# Patient Record
Sex: MALE | Race: WHITE | NOT HISPANIC OR LATINO | Employment: OTHER | URBAN - METROPOLITAN AREA
[De-identification: names, ages, dates, MRNs, and addresses within clinical notes are randomized per-mention and may not be internally consistent; named-entity substitution may affect disease eponyms.]

---

## 2018-10-10 ENCOUNTER — HISTORICAL (OUTPATIENT)
Dept: ADMINISTRATIVE | Facility: HOSPITAL | Age: 63
End: 2018-10-10

## 2018-10-10 LAB
ABS NEUT (OLG): 2.3
ALBUMIN SERPL-MCNC: 4.5 GM/DL (ref 3.4–5)
ALBUMIN/GLOB SERPL: 1.73 {RATIO} (ref 1.5–2.5)
ALP SERPL-CCNC: 55 UNIT/L (ref 38–126)
ALT SERPL-CCNC: 30 UNIT/L (ref 7–52)
AST SERPL-CCNC: 30 UNIT/L (ref 15–37)
BILIRUB SERPL-MCNC: 1 MG/DL (ref 0.2–1)
BILIRUBIN DIRECT+TOT PNL SERPL-MCNC: 0.2 MG/DL (ref 0–0.5)
BILIRUBIN DIRECT+TOT PNL SERPL-MCNC: 0.8 MG/DL
BUN SERPL-MCNC: 24 MG/DL (ref 7–18)
CALCIUM SERPL-MCNC: 9.2 MG/DL (ref 8.5–10)
CHLORIDE SERPL-SCNC: 105 MMOL/L (ref 98–107)
CHOLEST SERPL-MCNC: 248 MG/DL (ref 0–200)
CHOLEST/HDLC SERPL: 4.8 {RATIO}
CO2 SERPL-SCNC: 32 MMOL/L (ref 21–32)
CREAT SERPL-MCNC: 0.78 MG/DL (ref 0.6–1.3)
ERYTHROCYTE [DISTWIDTH] IN BLOOD BY AUTOMATED COUNT: 12.9 % (ref 11.5–17)
GLOBULIN SER-MCNC: 2.6 GM/DL (ref 1.2–3)
GLUCOSE SERPL-MCNC: 106 MG/DL (ref 74–106)
HCT VFR BLD AUTO: 50.3 % (ref 42–52)
HDLC SERPL-MCNC: 52 MG/DL (ref 35–60)
HGB BLD-MCNC: 16.6 GM/DL (ref 14–18)
LDLC SERPL CALC-MCNC: 188 MG/DL (ref 0–129)
LYMPHOCYTES # BLD AUTO: 1.9 X10(3)/MCL (ref 0.6–3.4)
LYMPHOCYTES NFR BLD AUTO: 40.2 % (ref 13–40)
MCH RBC QN AUTO: 33.7 PG (ref 27–31.2)
MCHC RBC AUTO-ENTMCNC: 33 GM/DL (ref 32–36)
MCV RBC AUTO: 102 FL (ref 80–94)
MONOCYTES # BLD AUTO: 0.5 X10(3)/MCL (ref 0–1.8)
MONOCYTES NFR BLD AUTO: 10.7 % (ref 0.1–24)
NEUTROPHILS NFR BLD AUTO: 49.1 % (ref 47–80)
PLATELET # BLD AUTO: 189 X10(3)/MCL (ref 130–400)
PMV BLD AUTO: 8.8 FL
POTASSIUM SERPL-SCNC: 4.5 MMOL/L (ref 3.5–5.1)
PROT SERPL-MCNC: 7.1 GM/DL (ref 6.4–8.2)
PSA SERPL-MCNC: 1.53 NG/ML (ref 0–4.5)
RBC # BLD AUTO: 4.92 X10(6)/MCL (ref 4.7–6.1)
SODIUM SERPL-SCNC: 141 MMOL/L (ref 136–145)
TRIGL SERPL-MCNC: 90 MG/DL (ref 30–150)
TSH SERPL-ACNC: 1.51 MIU/ML (ref 0.35–4.94)
VLDLC SERPL CALC-MCNC: 18 MG/DL
WBC # SPEC AUTO: 4.7 X10(3)/MCL (ref 4.5–11.5)

## 2019-04-12 ENCOUNTER — HISTORICAL (OUTPATIENT)
Dept: LAB | Facility: HOSPITAL | Age: 64
End: 2019-04-12

## 2019-04-12 LAB
ERYTHROCYTE [SEDIMENTATION RATE] IN BLOOD: 10 MM/HR (ref 0–15)
RHEUMATOID FACT SERPL-ACNC: <10 IU/ML (ref 0–15)

## 2020-10-30 LAB
BUN SERPL-MCNC: 19 MG/DL (ref 7–18)
CALCIUM SERPL-MCNC: 9.7 MG/DL (ref 8.5–10.1)
CHLORIDE SERPL-SCNC: 104 MMOL/L (ref 98–107)
CO2 SERPL-SCNC: 27 MMOL/L (ref 21–32)
CREAT SERPL-MCNC: 0.87 MG/DL (ref 0.6–1.3)
GLUCOSE SERPL-MCNC: 124 MG/DL (ref 74–106)
POTASSIUM SERPL-SCNC: 4.4 MMOL/L (ref 3.5–5.1)
SODIUM SERPL-SCNC: 139 MEQ/L (ref 131–145)

## 2022-04-07 ENCOUNTER — HISTORICAL (OUTPATIENT)
Dept: ADMINISTRATIVE | Facility: HOSPITAL | Age: 67
End: 2022-04-07

## 2022-04-24 VITALS
HEIGHT: 69 IN | SYSTOLIC BLOOD PRESSURE: 128 MMHG | DIASTOLIC BLOOD PRESSURE: 82 MMHG | BODY MASS INDEX: 28.15 KG/M2 | WEIGHT: 190.06 LBS

## 2022-05-01 NOTE — HISTORICAL [facility] CERNER
This is a historical note converted from Francesca. Formatting and pictures may have been removed.  Please reference Francesca for original formatting and attached multimedia. Chief Complaint  WELLNESS CPX FAST  History of Present Illness  Patient presents today for wellness exam. ?He does have a history of carpal tunnel syndrome which she states is much improved since taking steroids?and now taking Aleve as needed.? He did stop his red rice yeast due to some joint pain. ?He is anxious to see what his current levels are because he has improved his lifestyle.  Review of Systems  Constitutional:?No fever, no weakness, no weight loss, no fatigue  Eye: ? ? ? ? ? ? ? ???No eye redness, drainage, or pain  ENMT:??? ? ? ? ? ? No sore?throat pain, postnasal drainage, ?or mucus production  Respiratory:????No wheezing,?no shortness of breath  Cardiovascular:??No chest pain, no JAMISON  Gastrointestinal:?No nausea, vomiting, or diarrhea. No abdominal pain, no hematochezia or melena  Musculoskeletal:?No muscle or joint pain, no joint swelling  Integumentary:??? No skin rash or abnormal lesion  Neuro:??No headaches, dizziness, or weakness  Psych:?No anxiety, depression, or SI/HI  Endo:??No polyuria, polydipsia, or polyphagia  Heme/Lymph:??No bruising or lymphadenopathy  Physical Exam  Vitals & Measurements  T:?37.1? ?C (Oral)? HR:?68(Peripheral)? BP:?114/72?  HT:?175?cm? HT:?175?cm? WT:?84.8?kg? WT:?84.8?kg? BMI:?27.69?  General: ???? ? ? ? ? Well developed, well-nourished, in no acute distress  Eye: ? ? ? ? ? ? ? ? ? ??Clear conjunctiva, eyelids normal  HENT:??? ? ? ? ? ? ? ? No erythema, No exudate or swelling, TMs clear  Neck:??? ? ? ? ? ? ? ? ?Full range of motion, No cervical lymphadenopathy  Respiratory:??? ? ?Clear to auscultation bilaterally  Cardiovascular:?Regular rate and rhythm without murmurs, gallops or rubs  Abdomen: ? ? ? ? ?Soft, NT, ND, NABS x4, no HSM  Musculoskeletal:?No tenderness, joints WNL, FROM, neg SLR, no  CCE  Neuro: ? ? ? ? ? ? ? ? ?No motor/sensory deficits, Reflexes 2+ throughout, CN II-XII intact  Integumentary: ??No rashes or skin lesions present  LN:?WNL  Assessment/Plan  1.?Wellness examination  ?Normal Exam  Colonoscopy 2015 next due 2025  Ordered:  Automated Diff, Routine collect, 10/10/18 9:46:00 CDT, Blood, Collected, Stop date 10/10/18 9:46:00 CDT, Lab Collect, Wellness examination, 10/10/18 9:46:00 CDT  CBC w/ Auto Diff, Routine collect, 10/10/18 9:46:00 CDT, Blood, Stop date 10/10/18 9:46:00 CDT, Lab Collect, Wellness examination, 10/10/18 9:46:00 CDT  Comprehensive Metabolic Panel, Routine collect, 10/10/18 9:46:00 CDT, Blood, Stop date 10/10/18 9:46:00 CDT, Lab Collect, Wellness examination  Hyperlipidemia, 10/10/18 9:46:00 CDT  Lipid Panel, Now collect, 10/10/18 9:46:00 CDT, Blood, Stop date 10/10/18 9:46:00 CDT, Lab Collect, Wellness examination  Hyperlipidemia, 10/10/18 9:46:00 CDT  Prostate Specific Antigen, Routine collect, 10/10/18 9:46:00 CDT, Blood, Stop date 10/10/18 9:46:00 CDT, Lab Collect, Wellness examination, 10/10/18 9:46:00 CDT  Thyroid Stimulating Hormone, Routine collect, 10/10/18 9:46:00 CDT, Blood, Stop date 10/10/18 9:46:00 CDT, Lab Collect, Wellness examination, 10/10/18 9:46:00 CDT  ?  2.?Hyperlipidemia  ?Await laboratory analysis then decide whether to restart supplementation.  Ordered:  Comprehensive Metabolic Panel, Routine collect, 10/10/18 9:46:00 CDT, Blood, Stop date 10/10/18 9:46:00 CDT, Lab Collect, Wellness examination  Hyperlipidemia, 10/10/18 9:46:00 CDT  Lipid Panel, Now collect, 10/10/18 9:46:00 CDT, Blood, Stop date 10/10/18 9:46:00 CDT, Lab Collect, Wellness examination  Hyperlipidemia, 10/10/18 9:46:00 CDT  ?  3.?Neuropathy of right hand  ?resolved with streches and NSAIDS  ?  Needs flu shot  ?   Problem List/Past Medical History  Ongoing  Bilateral wrist pain  Bronchitis  Hyperlipidemia  Neuropathy of right hand  Wellness  examination  Historical  Aneurysm  Procedure/Surgical History  Colonoscopy (05/05/2015)   Medications  B 100 Complex, 1 tab(s), Oral, Daily  Flax Oil oral capsule, 1000 mg, Oral, Daily  TESTOSTERONE BOOSTER UPTEST, 2 tab(s), Oral, At Bedtime  Vitamin C 1000 mg oral capsule, 1000 mg, Oral, Daily  vitamin E 1000 intl units oral capsule, 1000 IntUnit= 1 cap(s), Oral, Daily  Allergies  morphine?(Unknown)  Social History  Alcohol  Current, Daily, 09/11/2018  Employment/School  Employed, 09/11/2018  Exercise  Exercise duration: 40. Exercise frequency: 3-4 times/week. Exercise type: Yoga., 09/11/2018  Home/Environment  Lives with Spouse., 09/11/2018  Nutrition/Health  Regular, 09/11/2018  Substance Abuse  Past, Marijuana, 09/11/2018  Tobacco  Former smoker Use:. Cigarettes Type:. 40 per day. 30 year(s)., 09/11/2018  Family History  Acute myocardial infarction.: Father.  Alcoholism.: Brother.  Cancer: Father.  Complex congenital heart defect: Brother.  Hypothyroidism: Mother.  Primary malignant neoplasm of prostate: Father.  Stroke: Brother.  Immunizations  Vaccine Date Status Comments   pneumococcal 13-valent conjugate vaccine 10/10/2018 Given    influenza virus vaccine, inactivated 10/10/2018 Given    influenza virus vaccine, inactivated 12/26/2011 Recorded 2018-09-11: UNKNOWN CAMPAIGNID   Health Maintenance  Health Maintenance  ???Pending?(in the next year)  ??? ??OverDue  ??? ? ? ?Influenza Vaccine due??and every?  ??? ??Due?  ??? ? ? ?ADL Screening due??10/10/18??and every 1??year(s)  ??? ? ? ?Alcohol Misuse Screening due??10/10/18??and every 1??year(s)  ??? ? ? ?Aspirin Therapy for CVD Prevention due??10/10/18??and every 1??year(s)  ??? ? ? ?Depression Screening due??10/10/18??and every?  ??? ? ? ?Diabetes Screening due??10/10/18??and every?  ??? ? ? ?Tetanus Vaccine due??10/10/18??and every 10??year(s)  ??? ? ? ?Zoster Vaccine due??10/10/18??and every 100??year(s)  ???Satisfied?(in the past 1 year)  ???  ??Satisfied?  ??? ? ? ?Blood Pressure Screening on??10/10/18.??Satisfied by Jenna Cohen  ??? ? ? ?Body Mass Index Check on??10/10/18.??Satisfied by Jenna Cohen  ??? ? ? ?Diabetes Screening on??10/10/18.??Satisfied by Garrett Ernandez MD  ??? ? ? ?Influenza Vaccine on??10/10/18.??Satisfied by Jenna Cohen  ??? ? ? ?Lipid Screening on??10/10/18.??Satisfied by Garrett Ernandez MD  ??? ? ? ?Obesity Screening on??10/10/18.??Satisfied by Jenna Cohen  ?  ?

## 2022-06-10 PROCEDURE — 86140 C-REACTIVE PROTEIN: CPT | Performed by: NURSE PRACTITIONER

## 2022-06-10 PROCEDURE — 84100 ASSAY OF PHOSPHORUS: CPT | Performed by: NURSE PRACTITIONER

## 2022-06-10 PROCEDURE — 85651 RBC SED RATE NONAUTOMATED: CPT | Performed by: NURSE PRACTITIONER

## 2022-09-15 ENCOUNTER — HISTORICAL (OUTPATIENT)
Dept: ADMINISTRATIVE | Facility: HOSPITAL | Age: 67
End: 2022-09-15

## 2022-11-01 PROBLEM — G62.9 PERIPHERAL NERVE DISEASE: Status: ACTIVE | Noted: 2022-11-01

## 2022-11-01 PROBLEM — Z82.49 FAMILY HISTORY OF EARLY CAD: Status: ACTIVE | Noted: 2022-11-01

## 2022-11-01 PROBLEM — E78.5 HYPERLIPIDEMIA: Status: ACTIVE | Noted: 2022-11-01

## 2022-11-01 PROBLEM — M06.9 RHEUMATOID ARTHRITIS: Status: ACTIVE | Noted: 2022-11-01

## 2023-02-24 PROBLEM — I72.9 ANEURYSM: Status: ACTIVE | Noted: 2022-03-11

## 2023-02-24 PROBLEM — Z00.00 MEDICARE ANNUAL WELLNESS VISIT, SUBSEQUENT: Status: ACTIVE | Noted: 2023-02-24

## 2023-02-24 PROBLEM — R93.1 ELEVATED CORONARY ARTERY CALCIUM SCORE: Status: ACTIVE | Noted: 2022-03-11

## 2023-02-24 PROBLEM — Z82.49 FAMILY HISTORY OF MI (MYOCARDIAL INFARCTION): Status: ACTIVE | Noted: 2022-03-11

## 2023-05-29 PROBLEM — Z00.00 MEDICARE ANNUAL WELLNESS VISIT, SUBSEQUENT: Status: RESOLVED | Noted: 2023-02-24 | Resolved: 2023-05-29

## 2024-09-09 PROBLEM — Z00.00 MEDICARE ANNUAL WELLNESS VISIT, SUBSEQUENT: Status: RESOLVED | Noted: 2023-02-24 | Resolved: 2024-09-09

## 2024-10-01 PROBLEM — I72.9 ANEURYSM: Status: RESOLVED | Noted: 2022-03-11 | Resolved: 2024-10-01

## 2024-10-01 PROBLEM — D84.821 DRUG-INDUCED IMMUNODEFICIENCY: Status: ACTIVE | Noted: 2024-10-01

## 2024-10-01 PROBLEM — Z79.899 DRUG-INDUCED IMMUNODEFICIENCY: Status: ACTIVE | Noted: 2024-10-01

## 2025-08-23 ENCOUNTER — PATIENT MESSAGE (OUTPATIENT)
Dept: RESEARCH | Facility: HOSPITAL | Age: 70
End: 2025-08-23